# Patient Record
Sex: FEMALE | Race: BLACK OR AFRICAN AMERICAN | URBAN - METROPOLITAN AREA
[De-identification: names, ages, dates, MRNs, and addresses within clinical notes are randomized per-mention and may not be internally consistent; named-entity substitution may affect disease eponyms.]

---

## 2019-03-01 ENCOUNTER — HISTORICAL (OUTPATIENT)
Dept: ADMINISTRATIVE | Facility: HOSPITAL | Age: 44
End: 2019-03-01

## 2019-03-06 LAB
FINAL CULTURE: NORMAL
GRAM STN SPEC: NORMAL

## 2019-05-08 LAB — POC BETA-HCG (QUAL): NEGATIVE

## 2019-05-29 ENCOUNTER — HISTORICAL (OUTPATIENT)
Dept: ADMINISTRATIVE | Facility: HOSPITAL | Age: 44
End: 2019-05-29

## 2019-05-29 LAB
BUN SERPL-MCNC: 9 MG/DL (ref 7–18)
CALCIUM SERPL-MCNC: 9.4 MG/DL (ref 8.5–10.1)
CHLORIDE SERPL-SCNC: 106 MMOL/L (ref 98–107)
CO2 SERPL-SCNC: 27 MMOL/L (ref 21–32)
CREAT SERPL-MCNC: 0.7 MG/DL (ref 0.6–1.3)
CREAT/UREA NIT SERPL: 13
ERYTHROCYTE [DISTWIDTH] IN BLOOD BY AUTOMATED COUNT: 14.6 % (ref 11.5–14.5)
EST. AVERAGE GLUCOSE BLD GHB EST-MCNC: 131 MG/DL
GLUCOSE SERPL-MCNC: 102 MG/DL (ref 74–106)
HAV IGM SERPL QL IA: NONREACTIVE
HBA1C MFR BLD: 6.2 % (ref 4.2–6.3)
HBV CORE IGM SERPL QL IA: NONREACTIVE
HBV SURFACE AG SERPL QL IA: NEGATIVE
HCT VFR BLD AUTO: 39.5 % (ref 35–46)
HCV AB SERPL QL IA: NONREACTIVE
HGB BLD-MCNC: 12.9 GM/DL (ref 12–16)
HIV 1+2 AB+HIV1 P24 AG SERPL QL IA: NONREACTIVE
MCH RBC QN AUTO: 29.3 PG (ref 26–34)
MCHC RBC AUTO-ENTMCNC: 32.7 GM/DL (ref 31–37)
MCV RBC AUTO: 89.6 FL (ref 80–100)
PLATELET # BLD AUTO: 335 X10(3)/MCL (ref 130–400)
PMV BLD AUTO: 10.9 FL (ref 7.4–10.4)
POTASSIUM SERPL-SCNC: 3.2 MMOL/L (ref 3.5–5.1)
RBC # BLD AUTO: 4.41 X10(6)/MCL (ref 4–5.2)
SODIUM SERPL-SCNC: 140 MMOL/L (ref 136–145)
T PALLIDUM AB SER QL: NONREACTIVE
WBC # SPEC AUTO: 6.1 X10(3)/MCL (ref 4.5–11)

## 2019-06-04 ENCOUNTER — HOSPITAL ENCOUNTER (OUTPATIENT)
Dept: MEDSURG UNIT | Facility: HOSPITAL | Age: 44
End: 2019-06-05
Attending: OBSTETRICS & GYNECOLOGY | Admitting: OBSTETRICS & GYNECOLOGY

## 2019-06-04 LAB
B-HCG SERPL QL: NEGATIVE
POTASSIUM SERPL-SCNC: 4 MMOL/L (ref 3.5–5.1)

## 2019-06-05 LAB
ABS NEUT (OLG): 9.08 X10(3)/MCL (ref 2.1–9.2)
BASOPHILS # BLD AUTO: 0.01 X10(3)/MCL
BASOPHILS NFR BLD AUTO: 0 %
BUN SERPL-MCNC: 7 MG/DL (ref 7–18)
CALCIUM SERPL-MCNC: 8.7 MG/DL (ref 8.5–10.1)
CHLORIDE SERPL-SCNC: 107 MMOL/L (ref 98–107)
CO2 SERPL-SCNC: 27 MMOL/L (ref 21–32)
CREAT SERPL-MCNC: 0.6 MG/DL (ref 0.6–1.3)
CREAT/UREA NIT SERPL: 12
ERYTHROCYTE [DISTWIDTH] IN BLOOD BY AUTOMATED COUNT: 14.8 % (ref 11.5–14.5)
GLUCOSE SERPL-MCNC: 130 MG/DL (ref 74–106)
HCT VFR BLD AUTO: 33.3 % (ref 35–46)
HGB BLD-MCNC: 10.4 GM/DL (ref 12–16)
IMM GRANULOCYTES # BLD AUTO: 0.05 10*3/UL
IMM GRANULOCYTES NFR BLD AUTO: 0 %
LYMPHOCYTES # BLD AUTO: 1.31 X10(3)/MCL
LYMPHOCYTES NFR BLD AUTO: 11 % (ref 13–40)
MCH RBC QN AUTO: 28.8 PG (ref 26–34)
MCHC RBC AUTO-ENTMCNC: 31.2 GM/DL (ref 31–37)
MCV RBC AUTO: 92.2 FL (ref 80–100)
MONOCYTES # BLD AUTO: 1.04 X10(3)/MCL
MONOCYTES NFR BLD AUTO: 9 % (ref 4–12)
NEUTROPHILS # BLD AUTO: 9.08 X10(3)/MCL
NEUTROPHILS NFR BLD AUTO: 79 X10(3)/MCL
PLATELET # BLD AUTO: 300 X10(3)/MCL (ref 130–400)
PMV BLD AUTO: 10.4 FL (ref 7.4–10.4)
POTASSIUM SERPL-SCNC: 4.1 MMOL/L (ref 3.5–5.1)
RBC # BLD AUTO: 3.61 X10(6)/MCL (ref 4–5.2)
SODIUM SERPL-SCNC: 138 MMOL/L (ref 136–145)
WBC # SPEC AUTO: 11.5 X10(3)/MCL (ref 4.5–11)

## 2022-04-11 ENCOUNTER — HISTORICAL (OUTPATIENT)
Dept: ADMINISTRATIVE | Facility: HOSPITAL | Age: 47
End: 2022-04-11

## 2022-04-28 VITALS
OXYGEN SATURATION: 98 % | WEIGHT: 249.13 LBS | BODY MASS INDEX: 47.03 KG/M2 | HEIGHT: 61 IN | SYSTOLIC BLOOD PRESSURE: 117 MMHG | DIASTOLIC BLOOD PRESSURE: 85 MMHG

## 2022-05-04 NOTE — HISTORICAL [facility] CERNER
This is a historical note converted from [employer]. Formatting and pictures may have been removed.  Please reference [employer] for original formatting and attached multimedia. Indication for Surgery  43  with menorrhagia, uterine fibroids, and dysmenorrhea desiring definitive surgical management.  Preoperative Diagnosis  menorrhagia, fibroids, uteromegaly, dysmenorrhea  Postoperative Diagnosis  samE  Operation  TLH/BS/Cystoscopy > 250 g; *22 modifier for >45 minutes spent for specimen removal in via vagina  Surgeon(s)  LORI Talley, PGY-3  Dr. Tucker Attending  Assistant  Dr. Monsivais, PGY-2  Anesthesia  GETA  Estimated Blood Loss  300 ml  Urine Output  400 mm  Findings  no evidence of visceral/vascular injury at abdominal entry site, 18 cm uterus,?normal bilateral tubes with evidence of prior fimbriectomy, normal liver and appendix, normal bilateral ovaries. Cystoscopy revealed brisk efflux from bilateral ureteral orifices and no suture or damage to bladder  Specimen(s)  uterus, cervix, bilateral fallopian tubes  Complications  none  Technique  The patient was taken to the operating room with IVF running.? SCDs were placed on bilateral lower extremities for DVT prophylaxis.??General anesthesia was obtained without difficulty and found to be adequate and an orogastric tube was placed. She received?Gentamicin and clindamycin?for infection prophylaxis. ?Arms were tucked at her sides bilaterally and padded. She was placed in the dorsal lithotomy position with legs in Jamie type stirrups. ?Exam under anesthesia revealed the findings as above. ?She was prepped and draped in the normal sterile fashion. A timeout procedure was performed. A marc catheter was placed to drain the bladder.  ?  A speculum was placed into the vagina. ?The anterior lip of the cervix was grasped with a single-toothed tenaculum. ?The uterus was sounded without difficulty to 17cm. ?The ?BEBETO Arch uterine manipulator and KOH cup were placed in  traditional fashion. ?  ?  1% Lidocaine plain was injected into the?left mid abdominal wall. ?A 5mm incision was made and a 5 mm Visiport trocar with the 0-degree laparoscope was inserted into the abdomen while the abdominal wall was tented upward from the right. ?The obturator was removed and placement ascertained with the laparoscope. ?The abdomen and pelvis were insufflated with CO2 gas to a level of 15 mmHg. ?No visceral or vascular injury occurred with entry into abdomen. ?Survey of the upper abdomen reveled the above noted findings.? At that time, Trendelenberg position was obtained to keep the bowel out of the operative field. ?A survey of the pelvis was performed with findings as above. In the same fashion as the first, an additional 10 mm trocar was inserted in the midline?3 cm superior to the?umbilicus under direct laparoscopic visualization. Additional trocars- 5mm in the right mid and lower abdomen?and 5mm into the left lateral pelvis approximately 2 cm superior and 2 cm lateral to the ASIS were inserted in the same fashion under direct visualization for a total of five trocars. The 10 mm 0 degree laparoscope was introduced.  The uterus was elevated from the pelvis using the uterine manipulator. ?  ?  The?right uteroovarian ligament was clamped, coagulated and transected?using the Ligasure. The right?fallopian?tube and the mesosalpinx was?clamped, coagulated and transected up to the level of the cornua using the?Ligasure. Next, the?round ligament and remainder of the posterior broad ligament was clamped, coagulated, and transected using the Ligasure.? The anterior and posterior leaves of the broad ligament were dissected individually and good hemostasis was noted.? The?right uterine artery was skeletonized. ?The bladder was noted to be densely adhered at the level?of the internal os.??  ?  Attention was now turned to the left side of the uterus. The same procedure was performed, starting with the  coagulation and transection?of the mesosalpinx, followed by coagulation and transection of the utreroovarian ligament and transection of the left round ligament using the Ligasure.?The left ureter was identified on the medial leaf. Anterior and posterior broad ligaments were then taken down. ?The?left uterine artery was skeletonized. ?The anterior bladder flap was further developed using the atraumatic graspers and Ligasure. Excellent exposure of the pubocervical fascia along the anterior cup was noted. ?The left uterine artery was coagulated and transected and good hemostasis was noted. ?  ?   Attention was then turned back to the right aspect of the uterus.?The?bladder flap was further developed and the uterine artery was clamped, coagulated, and transected. Good hemostasis was noted.  ?  The colpotomy was then performed using the monopolar hook?and upward traction on the uterus. ?This colpotomy was carried circumferentially around posteriorly until the uterus and cervix were freed from the superior aspect of the vagina. ?  ?  Attention was now turned to removal of the specimen vaginally. ?The uterine manipulator was removed and the cervix was grapsed with a triple tooth tenaculum. The 10 blade scalpel was used to?bivalve the cervix. The remainder of the uterus was morcellated out through the vagina by cutting up to the nearest fibroid, excising uterine tissue and then regrasping.?These steps were repeated until the uterus was decompressed?to sufficiently be delivered through the vagina.?The uterus, cervix, fibroids and bilateral fallopian tubes were handed off the field and sent to pathology for further analysis.?  ?  The vaginal cuff was then closed using 0 Vicryl suture at the?left and then?right angle. The remainder of the cuff was closed with figure of eight stitches using 0 Vicryl.?Gloves were exchanged.  ?   The abdomen was reinspected laparoscopically; the pelvis was copiously irrigated and suctioned out  and all pedicles were noted to be hemostatic.  ?   The Bursee needle was used to close the 10mm trocar site with 0 Vicryl stitch. All instruments and?four of the other trocars were removed with direct laparoscopic visualization without difficulty. ?The patient was taken out of the Trendelenburg position and the pneumoperitoneum was removed using 5 positive pressure breaths with the final trocar remaining. ?The final trocar was removed without difficulty. The skin was closed with a 4-0 Vicryl in a subcuticular fashion. ?The trocar incisions were dressed with skin glue.  ?  Attention was then turned to the cystoscopy. The marc was removed. Under direct visualization, the 70 degree cystoscope was introduced into the urethra and bladder without difficulty, normal saline used as distention medium. The findings were as above. ?The cystoscope was removed without difficulty.?  ?  The patient tolerated the procedure well. ?All instrument and sponge counts were correct times two. ?The patient was taken to the recovery room in a stable condition.  ?  Dr. Tucker was present and scrubbed for the entirety of the procedure.   I was personally present and assisted throughout due to level of difficulty of this case.? I personally morcellated the specimen for >30 minutes due to its large nature and to maximize her healing benefits as well as reduce her surgical risks by avoiding a larger abdominal incision.  I agree with note above.

## 2022-05-04 NOTE — HISTORICAL [facility] CERNER
This is a historical note converted from [employer]. Formatting and pictures may have been removed.  Please reference [employer]a for original formatting and attached multimedia. Admit and Discharge Dates  Admit Date: 06/04/2019  Discharge Date: 06/05/2019  ?  Physicians  Attending Physician - Caitlin ANTONIO, May S  Admitting Physician - Caitlin ANTONIO, May S  Primary Care Physician - Heather Brothers NP  ?  Surgical Procedures  06/04/2019 - [id number] - Hysterectomy Total Laparoscopic  06/04/2019 - [id number] - Cystoscopy  06/04/2019 - [id number] - Salpingectomy  ?  Admission Information  43F scheduled for TLH/BS/Cystoscopy  Hospital Course  Surgery went without complication. Patient was able to spontaneously void shortly after surgery.?She was discharged on POD 1 once ambulating, voiding, tolerating PO, and had good pain control with PO pain medication.  ?  ?  Lab Results  Test Name Test Result Date/Time   Sodium Lvl 138 mmol/L 06/05/2019 06:55 CDT   Potassium Lvl 4.1 mmol/L 06/05/2019 06:55 CDT   Creatinine 0.60 mg/dL 06/05/2019 06:55 CDT   WBC 11.5 x10(3)/mcL (High) 06/05/2019 06:55 CDT   RBC 3.61 x10(6)/mcL (Low) 06/05/2019 06:55 CDT   Hct 33.3 % (Low) 06/05/2019 06:55 CDT   Platelet 300 x10(3)/mcL 06/05/2019 06:55 CDT   MCV 92.2 fL 06/05/2019 06:55 CDT   Objective  Vitals & Measurements  T:?36.7? ?C (Oral)? TMIN:?36.6? ?C (Temporal Artery)? TMAX:?37.0? ?C (Oral)? HR:?92(Peripheral)? RR:?18? BP:?135/78? SpO2:?98%? WT:?115.6?kg?  Physical Exam  General Appearance: Alert, cooperative, no distress,  Lungs: Clear to auscultation bilaterally, respirations unlabored  Heart: Regular rate and rhythm, S1 and S2 normal, no murmur, rub or gallop  Abdomen: Soft, appropriately ttp, hypoactive bowel sounds, no masses, no organomegaly  port sites: c/d/i with dermabond in place  Extremities: Extremities normal, atraumatic, no cyanosis or edema. SCDs in place and working  Skin: Skin turgor normal, no rashes or  lesions  ?  ?  ?  Patient Discharge Condition  stable  Discharge Disposition  home   Discharge Medication Reconciliation  Prescribed  acetaminophen (Tylenol Caplet 500 mg oral tablet)?500 mg, Oral, q6hr, PRN pain, mild  docusate (docusate sodium 100 mg oral capsule)?100 mg, Oral, BID  metoprolol (metoprolol succinate 50 mg oral tablet, extended release)?50 mg, Oral, Daily  oxyCODONE (oxycodone 5 mg oral tablet)?5 mg, Oral, q4hr, PRN pain  Continue  albuterol (Ventolin HFA 90 mcg/inh inhalation aerosol)  amitriptyline (amitriptyline 10 mg oral tablet)?10 mg, Oral, qPM  amlodipine (Norvasc 10 mg oral tablet)?10 mg, Oral, Daily  cetirizine (Zyrtec 10 mg oral tablet)?10 mg, Oral, Daily  fluticasone nasal (fluticasone 50 mcg/inh nasal spray)  hydrochlorothiazide (hydroCHLOROthiazide 12.5 mg oral capsule)?12.5 mg, Oral, Daily  hydrochlorothiazide (hydrochlorothiazide 25 mg oral tablet)?25 mg, Oral, Daily  hydrocortisone topical (Anusol-HC 2.5% rectal cream with applicator)  meclizine (meclizine 25 mg oral tablet)?25 mg, Oral, TID, PRN for dizziness  medroxyPROGESTERone (medroxyPROGESTERone 10 mg oral tablet)?10 mg, Oral, Daily  metoprolol (METOPROL TAR TAB 50MG)?50 mg, Oral, Daily  montelukast (montelukast 10 mg oral TABLET)?10 mg, Oral, Daily  omeprazole (Omeprazole 20mg)?20 mg, Oral, Daily  ?  Education and Orders Provided  Total Laparoscopic Hysterectomy, Care After  Cystoscopy, Care After  Form - Excuse from Work, School, or Physical Activity [facility] (kw) ([place])  Discharge - 06/04/19 11:21:00 CDT, Home, do not discharge until MD evaluates patient?  Discharge - 06/05/19 6:31:00 CDT, Home?  ?  Follow up  Candi Talley on 06/21/2019  ????Keep scheduled appointment at [facility], entrance #5. ?Contact phone# [phone]  ?      I personally saw the patient on date of discharge and reviewed all associated vitals, labs and related events.? I saw and examined her with the team today and spent?5-10 minutes at bedside.? I  discussed post op expectations, ambulation encouraged and to call us with any issues.? I confirmed that she has our clinic contact information.? All of her questions were answered.  Stable and meeting all criteria for discharge.? Has scheduled follow up as above.

## 2022-05-04 NOTE — HISTORICAL [facility] CERNER
This is a historical note converted from [employer]. Formatting and pictures may have been removed.  Please reference [employer] for original formatting and attached multimedia. No interval update to H&P. Patient accompanied by her .?  ?   Temp 36.9  /84  P 97  R 18  98% on RA?  ?  GEN: NAD, A&O  CV: rrr  Lungs: CTAB  Abd: soft, non ttp  Calves: non ttp,?no evidence of DVT  ?   A/P: 43  with menorrhagia presents for TLH/BS/Cystoscopy  ?   Patients home metoprolol taken. Desires same day discharge if possible.  UPT negative  ?  ?  To OR for TLH/BS/Cystoscopy  ?  ?  ?  ?  ?  ?  ?  ?  ?  ?  ?  ?  43  with h/o menorrhagia and dysmenorrhea with 17 week size uterus presents for pre op eval for TLH/BS/Cystoscopy. Reports a long history of menorrhagia with every day bleeding?for the past 9 months?and dysmenorrhea and desires definitive surgical management.  ?  OBHx:  x 3, CS x 1  GYNHx: h/o trichomonas, last pap 2019, NILM, unsure of LMP, s/p BTL  Review of Systems  Constitutional: No fever, No chills.  Respiratory: No shortness of breath.  Cardiovascular: No chest pain, No syncope.  Gastrointestinal: No nausea, No vomiting, No diarrhea, No constipation.  Genitourinary: No dysuria, No hematuria, No abnormal discharge or bleeding.  Neurologic: Alert and oriented X4  ?  Physical Exam  ???Vitals & Measurements  ??T:?37.3? ?C (Oral)? HR:?74(Peripheral)? RR:?12? BP:?121/89? WT:?113.8?kg?  General Appearance: Alert, cooperative, no distress,  Lungs: Clear to auscultation bilaterally, respirations unlabored  Heart: Regular rate and rhythm, S1 and S2 normal, no murmur, rub or gallop  Abdomen: Soft, non-tender, bowel sounds active all four quadrants, no masses, no organomegaly  Incision: pfannensteil well healed  Genitalia:  - External genitalia: Normal without lesions  - Urethral meatus: Normal  - Bladder: No suprapubic tenderness  - Vaginal/pelvic support: Normal, moist vaginal mucosa without lesions.?  adequate capacity  - Cervix: No CMT, no lesions  - Uterus: 17-18 weeks, not broad, minimal descent  - Adnexa/parametria: No fullness or masses  - Anus/perineum: Intact without lesions or hemorrhoids  Extremities: Extremities normal, atraumatic, no cyanosis or edema  Skin: Skin turgor normal, no rashes or lesions.  Assessment/Plan  ?  43  with menorrhagia, uterine fibroids, and dysmenorrhea scheduled for TLH/BS/Cystoscopy  ?  Counseling: Alternatives to this planned procedure were explained to the patient including expectant, medical and other types of surgical management. This procedure and its risks, reasons, benefits and complications (including injury to bowel, bladder, major blood vessel, ureter, bleeding, possibility of transfusion, infection, scarring, dyspareunia, erosion, further surgery, worsening incontinence, failure of the procedure or fistula formation) were reviewed in detail.?  Additionally, ovarian conservation versus elective risk reducing ovarian resection were discussed with the patient. She understands the risk for reoperation for ovarian etiology to be 5-10%, the risk for ovarian cancer in women to be 1 in 70, and the protective effects of ovarian hormones including cardiovascular and bone health. After discussion, the patient desires ovarian conservation with the understanding that if any disease is suspected or bleeding that necessitates it - they may be removed.?  ?  PACE Appointment requested, surgical consents signed  Metoprolol on AM of [facility]  Pre Op labs CBC, BMP, T&S,?UPT AM of surgery, and?EKG  Gent/Clinda for infection prophylaxis  SCDs for DVT Prophylaxis  ?  To OR for TLH/BS/Cystoscopy on 6-4 ?  ?  ?  ?  ?  ?  ?  ?  ?  ?  ?  ?  Problem List/Past Medical History  Ongoing  ??Acid reflux  ?Arthritis  ?HTN (hypertension)  ?Morbid obesity  asthma  Procedure/Surgical HistoryCesarean section  extra thumb on left hand removed  Tubal Ligation   ?  Medications  Inpatient  ??No active  inpatient medications  Home  ??ferrous sulfate 325 mg (65 mg elemental iron) oral tablet, 325 mg= 1 tab(s), Oral, Daily  ??fluticasone 50 mcg/inh nasal spray  ??hydroCHLOROthiazide 12.5 mg oral capsule, 12.5 mg= 1 cap(s), Oral, Daily  ??hydrochlorothiazide 25 mg oral tablet, 25 mg= 1 tab(s), Oral, Daily  ??meclizine 25 mg oral tablet, 25 mg= 1 tab(s), Oral, TID, PRN  ??medroxyPROGESTERone 10 mg oral tablet, 10 mg= 1 tab(s), Oral, Daily  ??METOPROL TAR TAB 50MG, 50 mg= 1 tab(s), Oral, Daily  ??Norvasc 10 mg oral tablet, 10 mg= 1 tab(s), Oral, Daily  ??Omeprazole 20mg, 20 mg= 1 cap(s), Oral, Daily  ??Ventolin HFA 90 mcg/inh inhalation aerosol  ??Zyrtec 10 mg oral tablet, 10 mg= 1 tab(s), Oral, Daily  Allergies  Cipro?(.)  Latex?(yeast infection)  Seafood?(C/O: a swelling)  ibuprofen  lisinopril?(.)  penicillins?(C/O: a swelling)  Social History  Alcohol  ?Never, 04/03/2019  ?Never, 06/03/2015  Employment/School  ?Employed, Work/School description: , 40 hours a week,. Activity level: Moderate physical work. Operates hazardous equipment: No. 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 Living situation: Home/Independent. Alcohol abuse in household: No. Substance abuse in household: No. Smoker in household: No. Injuries/Abuse/Neglect in household: No. Feels unsafe at home: No. TV/Computer concerns: No., 04/03/2019  Nutrition/Health  ?Regular, Caffeine intake amount: none. Wants to lose weight: No. Sleeping concerns: No. Feels highly stressed: No., 04/03/2019  Sexual  ?Sexually active: Yes. Sexually active at age 18 Years. Number of current partners 0. Number of lifetime partners 4. Sexual orientation: Straight or heterosexual. Uses condoms: No. History of sexual abuse: No. Gender Identity Identifies as female.,  04/03/2019  Substance Abuse  ?Never, 06/03/2015  Tobacco  ?Never (less than 100 in lifetime), N/A, 05/08/2019  ?Never (less than 100 in lifetime), N/A, 04/03/2019  works in patient care  Family History  ?Hypertension.: Mother and Father.  ?Parkinson disease: Mother.  Diagnostic Results  (01/10/2019 11:58 CST US Pelvic Non-Obsetrics)  Radiology Report  INDICATION: N92.6  ?  COMPARISON: None  ?  FINDINGS: Transabdominal images were obtained. The uterus is 17.1 cm  in length with an AP diameter of 10.35 cm and a transverse diameter of  12.2 cm. The endometrial stripe is 1.2 cm. In the right anterior  uterus, there is a 4.1 cm heterogeneous echogenicity mass. In the left  uterus, there is a 5.5 cm heterogeneous intermediate echogenicity  mass. The left ovary measures 3.6 x 2.7 x 3.5 cm and contains multiple  cysts, the largest measuring 2.6 cm. The right ovary measures 2.7 x  1.6 x 2.3 cm. No adnexal masses are noted.  ?  IMPRESSION:?  1. Enlarged uterus. Uterine masses. These are most likely leiomyomas.  They do not appear to be submucosal.  2. Endometrial thickness of 1.2 cm. Correlate with the patients  menstrual cycle.  3. Minimally complex 2.6 cm left ovarian cyst  ? [1]    I have seen the patient today preoperatively and she is able to state procedure in her own words.? She has no further questions.

## 2022-09-21 ENCOUNTER — HISTORICAL (OUTPATIENT)
Dept: ADMINISTRATIVE | Facility: HOSPITAL | Age: 47
End: 2022-09-21